# Patient Record
Sex: MALE | Race: ASIAN | NOT HISPANIC OR LATINO | Employment: UNEMPLOYED | URBAN - METROPOLITAN AREA
[De-identification: names, ages, dates, MRNs, and addresses within clinical notes are randomized per-mention and may not be internally consistent; named-entity substitution may affect disease eponyms.]

---
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
## 2023-04-16 ENCOUNTER — HEALTH MAINTENANCE LETTER (OUTPATIENT)
Age: 18
End: 2023-04-16

## 2024-06-22 ENCOUNTER — HEALTH MAINTENANCE LETTER (OUTPATIENT)
Age: 19
End: 2024-06-22

## 2025-02-25 ENCOUNTER — OFFICE VISIT (OUTPATIENT)
Dept: URGENT CARE | Facility: URGENT CARE | Age: 20
End: 2025-02-25
Payer: COMMERCIAL

## 2025-02-25 VITALS
TEMPERATURE: 97.8 F | WEIGHT: 147 LBS | SYSTOLIC BLOOD PRESSURE: 125 MMHG | RESPIRATION RATE: 16 BRPM | HEART RATE: 87 BPM | OXYGEN SATURATION: 97 % | DIASTOLIC BLOOD PRESSURE: 82 MMHG

## 2025-02-25 DIAGNOSIS — H61.23 EXCESSIVE CERUMEN IN BOTH EAR CANALS: ICD-10-CM

## 2025-02-25 DIAGNOSIS — H93.8X1 SENSATION OF PLUGGED EAR ON RIGHT SIDE: Primary | ICD-10-CM

## 2025-02-25 PROCEDURE — 3079F DIAST BP 80-89 MM HG: CPT | Performed by: FAMILY MEDICINE

## 2025-02-25 PROCEDURE — 3074F SYST BP LT 130 MM HG: CPT | Performed by: FAMILY MEDICINE

## 2025-02-25 PROCEDURE — 69209 REMOVE IMPACTED EAR WAX UNI: CPT | Mod: 50 | Performed by: FAMILY MEDICINE

## 2025-02-25 NOTE — PROGRESS NOTES
Assessment/Plan:   1. Excessive cerumen in both ear canals  2. Sensation of plugged ear on right side (Primary)    Muffled hearing right ear and plugged sensation for 3 weeks. No pain or injury.   Impacted cerumen right canal and excess wax on the left as well.   Removed without difficulty via ear lavage by the support staff. Symptoms resolved.   Avoid q tips.   May use debrox in future if it happens again. This is over the counter.   Recheck as needed.     I discussed red flag symptoms, return precautions to clinic/ER and follow up care with patient/parent.  Expected clinical course, symptomatic cares advised. Questions answered. Patient/parent amenable with plan.      Subjective:     Suhas Donald is a 19 year old male who presents for evaluation of muffled hearing right ear.   Onset 3 weeks ago.   Cannot get it to 'pop' and has some crackling sounds.   No pain, no URI or stuffy nose. No drainage.   No trauma or excess noise/sound  No airplanes or swimming.     No Known Allergies  No current outpatient medications on file.     No current facility-administered medications for this visit.     Patient Active Problem List   Diagnosis    History of Bell's palsy    Flexural eczema       Objective:     /82 (BP Location: Right arm, Patient Position: Sitting, Cuff Size: Adult Regular)   Pulse 87   Temp 97.8  F (36.6  C) (Oral)   Resp 16   Wt 66.7 kg (147 lb)   SpO2 97%     Physical    General Appearance: Alert, pleasant, no distress, aVSS  Head: Normocephalic, without obvious abnormality, atraumatic  Eyes: Conjunctivae are normal.   Ears: Normal TM and external ear canal left ear - there is a large wax blob anteriorly but TM visualized well around it. Right ear is impacted with wax. No pain with retraction.   Bilateral ear lavage by Support staff with successful removal of wax. Normal Tms and canal bilaterally on repeat otoscopy. Able to hear rubbing fingers both sides and sensation of plugged ear and muffled  hearing is resolved.   Nose: No significant congestion.  Lungs: Clear to auscultation bilaterally, respirations unlabored  Heart: Regular rate and rhythm  Skin:  no rashes or lesions  Psychiatric: Patient has a normal mood and affect.         This note has been dictated in part using voice recognition software.  Any grammatical or context distortions are unintentional and inherent to the software.  Please feel free to contact me directly for clarification if needed.

## 2025-07-12 ENCOUNTER — HEALTH MAINTENANCE LETTER (OUTPATIENT)
Age: 20
End: 2025-07-12